# Patient Record
Sex: MALE
[De-identification: names, ages, dates, MRNs, and addresses within clinical notes are randomized per-mention and may not be internally consistent; named-entity substitution may affect disease eponyms.]

---

## 2022-01-10 ENCOUNTER — HOSPITAL ENCOUNTER (EMERGENCY)
Dept: HOSPITAL 56 - MW.ED | Age: 22
Discharge: HOME | End: 2022-01-10
Payer: SELF-PAY

## 2022-01-10 DIAGNOSIS — J40: Primary | ICD-10-CM

## 2022-01-10 DIAGNOSIS — Z20.822: ICD-10-CM

## 2022-01-10 LAB
FLUAV RNA UPPER RESP QL NAA+PROBE: NEGATIVE
FLUBV RNA UPPER RESP QL NAA+PROBE: NEGATIVE
SARS-COV-2 RNA RESP QL NAA+PROBE: NEGATIVE

## 2022-01-10 PROCEDURE — 0240U: CPT

## 2022-01-10 PROCEDURE — 99284 EMERGENCY DEPT VISIT MOD MDM: CPT

## 2022-01-10 NOTE — EDM.PDOC
ED HPI GENERAL MEDICAL PROBLEM





- General


Chief Complaint: Respiratory Problem


Stated Complaint: COVID SYMPTOMS


Time Seen by Provider: 01/10/22 19:48


Source of Information: Reports: Patient


History Limitations: Reports: No Limitations





- History of Present Illness


INITIAL COMMENTS - FREE TEXT/NARRATIVE: 





21-year-old male past medical history morbid obesity presents for Covid-like 

symptoms and known Covid exposure.  Patient notes that for the last week he has 

had nausea, a couple episodes of emesis, diarrhea, sore throat, nonproductive 

cough.  At times he does have some shortness of breath.  He does note body 

aches.  He is uncertain about fevers.  He notes that his roommate came in 

yesterday evening and tested positive.  He is unvaccinated.





- Related Data


                                    Allergies











Allergy/AdvReac Type Severity Reaction Status Date / Time


 


No Known Allergies Allergy   Verified 01/10/22 19:44











Home Meds: 


                                    Home Meds





. [No Known Home Meds]  01/10/22 [History]











Social & Family History





- Tobacco Use


Second Hand Smoke Exposure: No





- Caffeine Use


Caffeine Use: Reports: None





- Recreational Drug Use


Recreational Drug Use: No





ED ROS GENERAL





- Review of Systems


Review Of Systems: Comprehensive ROS is negative, except as noted in HPI.





ED EXAM, GENERAL





- Physical Exam


Exam: See Below


Exam Limited By: No Limitations


General Appearance: Alert, WD/WN, No Apparent Distress


Ears: Hearing Grossly Normal


Throat/Mouth: Normal Oropharynx, Normal Voice, No Airway Compromise


Head: Atraumatic, Normocephalic


Respiratory/Chest: No Respiratory Distress, Lungs Clear, Normal Breath Sounds, 

No Accessory Muscle Use


Cardiovascular: Normal Peripheral Pulses, Regular Rate, Rhythm


Extremities: Normal Inspection


Neurological: Alert, Normal Cognition, Normal Gait


Psychiatric: Normal Affect, Normal Mood


Skin Exam: Warm, Dry, Intact, Normal Color





Course





- Vital Signs


Last Recorded V/S: 


                                Last Vital Signs











Temp  98.7 F   01/10/22 19:40


 


Pulse  99   01/10/22 19:40


 


Resp  20   01/10/22 19:40


 


BP  185/84 H  01/10/22 19:40


 


Pulse Ox  96   01/10/22 19:40














- Orders/Labs/Meds


Labs: 


                                Laboratory Tests











  01/10/22 Range/Units





  17:00 


 


Influenza Type A RNA  NEGATIVE  (NEGATIVE)  


 


Influenza Type B RNA  NEGATIVE  (NEGATIVE)  


 


SARS-CoV-2 RNA (JEANNIE)  NEGATIVE  (NEGATIVE)  














- Re-Assessments/Exams


Free Text/Narrative Re-Assessment/Exam: 





01/10/22 19:56


Patient presents with Covid-like symptoms.  Will follow up COVID test and 

anticipate discharge with Regeneron therapy.


01/10/22 20:20


COVID test is negative; however given symptoms and exposure patient likely does 

have COVID and is no longer testing positive. Will d/c with return precautions. 

Will defer Regeneron therapy. 





Departure





- Departure


Time of Disposition: 20:20


Disposition: Home, Self-Care 01


Condition: Good


Clinical Impression: 


 Bronchitis








- Discharge Information


Instructions:  COVID-19: What to Do If You Are Sick- [facility] (03/17/2021)


Forms:  ED Department Discharge


Additional Instructions: 


Your Covid test is negative. However given your exposure, you should assume that

 you likely did have COVID but that you are no longer shedding viral particles 

and are no longer contagious. 





If you have difficulty breathing or chest pain you should come back to the 

emergency department for reassessment.





You can take Tylenol and Motrin for control of fevers and body aches.





Please isolate so you do not spread the disease to others.





The following information is given to patients seen in the emergency department 

who are being discharged to home. This information is to outline your options 

for follow-up care. We provide all patients seen in our emergency department 

with a follow-up referral.





The need for follow-up, as well as the timing and circumstances, are variable 

depending upon the specifics of your emergency department visit.





If you don't have a primary care physician on staff, we will provide you with a 

referral. We always advise you to contact your personal physician following an 

emergency department visit to inform them of the circumstance of the visit and 

for follow-up with them and/or the need for any referrals to a consulting 

specialist.





The emergency department will also refer you to a specialist when appropriate. 

This referral assures that you have the opportunity for follow-up care with a 

specialist. All of these measure are taken in an effort to provide you with 

optimal care, which includes your follow-up.





Under all circumstances we always encourage you to contact your private 

physician who remains a resource for coordinating your care. When calling for 

follow-up care, please make the office aware that this follow-up is from your 

recent emergency room visit. If for any reason you are refused follow-up, please

 contact the [facility] Emergency [facility]

nt at [phone] and asked to speak to the emergency department charge 

nurse.





Please follow up with your primary care physician. If you do not have a primary 

care physician, see below:


[facility]


[address] [place] [id number] [phone]





[facility]


[address] [place] [id number] [phone]








[facility]


[address] [place] [id number]


Phone: [phone]


Fax: [phone]








Sepsis Event Note (ED)





- Evaluation


Sepsis Screening Result: No Definite Risk





- Focused Exam


Vital Signs: 


                                   Vital Signs











  Temp Pulse Resp BP Pulse Ox


 


 01/10/22 19:40  98.7 F  99  20  185/84 H  96

## 2023-10-06 ENCOUNTER — HOSPITAL ENCOUNTER (EMERGENCY)
Dept: HOSPITAL 56 - MW.ED | Age: 23
Discharge: LEFT BEFORE DISCHARGE | End: 2023-10-06
Payer: SELF-PAY

## 2023-10-06 DIAGNOSIS — Z53.21: Primary | ICD-10-CM
